# Patient Record
Sex: FEMALE | Race: WHITE | NOT HISPANIC OR LATINO | URBAN - METROPOLITAN AREA
[De-identification: names, ages, dates, MRNs, and addresses within clinical notes are randomized per-mention and may not be internally consistent; named-entity substitution may affect disease eponyms.]

---

## 2017-08-31 ENCOUNTER — EMERGENCY (EMERGENCY)
Facility: HOSPITAL | Age: 27
LOS: 1 days | Discharge: HOME | End: 2017-08-31
Attending: EMERGENCY MEDICINE | Admitting: EMERGENCY MEDICINE
Payer: COMMERCIAL

## 2017-08-31 VITALS
HEIGHT: 66 IN | RESPIRATION RATE: 18 BRPM | TEMPERATURE: 98 F | DIASTOLIC BLOOD PRESSURE: 87 MMHG | SYSTOLIC BLOOD PRESSURE: 127 MMHG | OXYGEN SATURATION: 99 % | HEART RATE: 92 BPM | WEIGHT: 145.06 LBS

## 2017-08-31 VITALS
DIASTOLIC BLOOD PRESSURE: 66 MMHG | RESPIRATION RATE: 18 BRPM | OXYGEN SATURATION: 98 % | HEART RATE: 67 BPM | TEMPERATURE: 98 F | SYSTOLIC BLOOD PRESSURE: 101 MMHG

## 2017-08-31 DIAGNOSIS — R10.11 RIGHT UPPER QUADRANT PAIN: ICD-10-CM

## 2017-08-31 DIAGNOSIS — R10.31 RIGHT LOWER QUADRANT PAIN: ICD-10-CM

## 2017-08-31 LAB
ALBUMIN SERPL ELPH-MCNC: 4 G/DL — SIGNIFICANT CHANGE UP (ref 3.4–5)
ALP SERPL-CCNC: 50 U/L — SIGNIFICANT CHANGE UP (ref 40–120)
ALT FLD-CCNC: 16 U/L — SIGNIFICANT CHANGE UP (ref 12–42)
ANION GAP SERPL CALC-SCNC: 8 MMOL/L — LOW (ref 9–16)
APPEARANCE UR: CLEAR — SIGNIFICANT CHANGE UP
AST SERPL-CCNC: 8 U/L — LOW (ref 15–37)
BASOPHILS NFR BLD AUTO: 0.4 % — SIGNIFICANT CHANGE UP (ref 0–2)
BILIRUB SERPL-MCNC: 0.3 MG/DL — SIGNIFICANT CHANGE UP (ref 0.2–1.2)
BILIRUB UR-MCNC: NEGATIVE — SIGNIFICANT CHANGE UP
BUN SERPL-MCNC: 19 MG/DL — SIGNIFICANT CHANGE UP (ref 7–23)
CALCIUM SERPL-MCNC: 8.9 MG/DL — SIGNIFICANT CHANGE UP (ref 8.5–10.5)
CHLORIDE SERPL-SCNC: 104 MMOL/L — SIGNIFICANT CHANGE UP (ref 96–108)
CO2 SERPL-SCNC: 30 MMOL/L — SIGNIFICANT CHANGE UP (ref 22–31)
COLOR SPEC: YELLOW — SIGNIFICANT CHANGE UP
CREAT SERPL-MCNC: 0.93 MG/DL — SIGNIFICANT CHANGE UP (ref 0.5–1.3)
DIFF PNL FLD: (no result)
EOSINOPHIL NFR BLD AUTO: 1.7 % — SIGNIFICANT CHANGE UP (ref 0–6)
GLUCOSE SERPL-MCNC: 102 MG/DL — HIGH (ref 70–99)
GLUCOSE UR QL: NEGATIVE — SIGNIFICANT CHANGE UP
HCG UR QL: NEGATIVE — SIGNIFICANT CHANGE UP
HCT VFR BLD CALC: 37.4 % — SIGNIFICANT CHANGE UP (ref 34.5–45)
HGB BLD-MCNC: 13.5 G/DL — SIGNIFICANT CHANGE UP (ref 11.5–15.5)
IMM GRANULOCYTES NFR BLD AUTO: 0.4 % — SIGNIFICANT CHANGE UP (ref 0–1.5)
KETONES UR-MCNC: NEGATIVE — SIGNIFICANT CHANGE UP
LEUKOCYTE ESTERASE UR-ACNC: (no result)
LIDOCAIN IGE QN: 190 U/L — SIGNIFICANT CHANGE UP (ref 73–393)
LYMPHOCYTES # BLD AUTO: 31.6 % — SIGNIFICANT CHANGE UP (ref 13–44)
MCHC RBC-ENTMCNC: 31 PG — SIGNIFICANT CHANGE UP (ref 27–34)
MCHC RBC-ENTMCNC: 36.1 G/DL — HIGH (ref 32–36)
MCV RBC AUTO: 86 FL — SIGNIFICANT CHANGE UP (ref 80–100)
MONOCYTES NFR BLD AUTO: 6.1 % — SIGNIFICANT CHANGE UP (ref 2–14)
NEUTROPHILS NFR BLD AUTO: 59.8 % — SIGNIFICANT CHANGE UP (ref 43–77)
NITRITE UR-MCNC: NEGATIVE — SIGNIFICANT CHANGE UP
PH UR: 6.5 — SIGNIFICANT CHANGE UP (ref 5–8)
PLATELET # BLD AUTO: 188 K/UL — SIGNIFICANT CHANGE UP (ref 150–400)
POTASSIUM SERPL-MCNC: 3.2 MMOL/L — LOW (ref 3.5–5.3)
POTASSIUM SERPL-SCNC: 3.2 MMOL/L — LOW (ref 3.5–5.3)
PROT SERPL-MCNC: 7.2 G/DL — SIGNIFICANT CHANGE UP (ref 6.4–8.2)
PROT UR-MCNC: NEGATIVE MG/DL — SIGNIFICANT CHANGE UP
RBC # BLD: 4.35 M/UL — SIGNIFICANT CHANGE UP (ref 3.8–5.2)
RBC # FLD: 11 % — SIGNIFICANT CHANGE UP (ref 10.3–16.9)
SODIUM SERPL-SCNC: 142 MMOL/L — SIGNIFICANT CHANGE UP (ref 132–145)
SP GR SPEC: 1.01 — SIGNIFICANT CHANGE UP (ref 1–1.03)
UROBILINOGEN FLD QL: 0.2 E.U./DL — SIGNIFICANT CHANGE UP
WBC # BLD: 8.4 K/UL — SIGNIFICANT CHANGE UP (ref 3.8–10.5)
WBC # FLD AUTO: 8.4 K/UL — SIGNIFICANT CHANGE UP (ref 3.8–10.5)

## 2017-08-31 PROCEDURE — 76705 ECHO EXAM OF ABDOMEN: CPT | Mod: 26

## 2017-08-31 PROCEDURE — 99285 EMERGENCY DEPT VISIT HI MDM: CPT | Mod: 25

## 2017-08-31 PROCEDURE — 74177 CT ABD & PELVIS W/CONTRAST: CPT | Mod: 26

## 2017-08-31 RX ORDER — IOHEXOL 300 MG/ML
50 INJECTION, SOLUTION INTRAVENOUS ONCE
Qty: 0 | Refills: 0 | Status: COMPLETED | OUTPATIENT
Start: 2017-08-31 | End: 2017-08-31

## 2017-08-31 RX ORDER — SODIUM CHLORIDE 9 MG/ML
1000 INJECTION INTRAMUSCULAR; INTRAVENOUS; SUBCUTANEOUS ONCE
Qty: 0 | Refills: 0 | Status: COMPLETED | OUTPATIENT
Start: 2017-08-31 | End: 2017-08-31

## 2017-08-31 RX ADMIN — IOHEXOL 50 MILLILITER(S): 300 INJECTION, SOLUTION INTRAVENOUS at 06:45

## 2017-08-31 RX ADMIN — SODIUM CHLORIDE 1000 MILLILITER(S): 9 INJECTION INTRAMUSCULAR; INTRAVENOUS; SUBCUTANEOUS at 02:43

## 2017-08-31 RX ADMIN — IOHEXOL 50 MILLILITER(S): 300 INJECTION, SOLUTION INTRAVENOUS at 02:42

## 2017-08-31 NOTE — ED ADULT NURSE NOTE - CHPI ED SYMPTOMS NEG
no dysuria/no vomiting/no diarrhea/no blood in stool/no hematuria/no abdominal distension/no burning urination/no chills/no fever/no nausea

## 2017-08-31 NOTE — ED PROVIDER NOTE - MEDICAL DECISION MAKING DETAILS
rule out appy, gallstone. patient currently deferring analgesics. will continue to monitor, ck abd labs.

## 2017-08-31 NOTE — ED ADULT NURSE NOTE - OBJECTIVE STATEMENT
Pt presents to ED with c/o RUQ pain- woke her up from sleep. Upon RN assessment, states pain has resolved. No N/V/D. Appears in NAD.

## 2017-08-31 NOTE — ED PROVIDER NOTE - OBJECTIVE STATEMENT
, no pmhx, presents with resolved RUQ pain that awoke her from sleep this evening. notes pain was severe, sharp and gas like pain. no N/V/D. denies cp or sob. notes pain slowly resolved. denies rash. denies prior hx of gallstone, kidney stone or similar. no abd surgeries. denies uti symptoms.

## 2017-08-31 NOTE — ED PROVIDER NOTE - PROGRESS NOTE DETAILS
patient with resolved pain and tolerated POs. discussed repeating ct scan. patient has no palpable ttp over mcburneys, no fever, or elev wbc. no n/v in ed and eating and drinking at this time, will defer repeat scan. lives walking distance, and will return to ed for any return of pain to ruq or worsening. she verbalized understanding of plan and has no questions.
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
## 2024-09-17 NOTE — ED ADULT NURSE NOTE - CAS TRG GENERAL AIRWAY, MLM
REFERRING PHYSICIAN: Aleta Rosales MD     DATE: 9/17/2024  SURGEON(S):  ALETA ROSALES MD     PREOPERATIVE DIAGNOSIS: Left 5th metatarsal Bean fracture.     POSTOPERATIVE DIAGNOSIS: Left 5th metatarsal Bean fracture.     PROCEDURE PERFORMED:  Open reduction and internal fixation, left 5th metatarsal Bean fracture.       ANESTHESIA:  Popliteal block with sedation.     ESTIMATED BLOOD LOSS:  Minimal.     COMPLICATIONS:  None.     INDICATIONS FOR PROCEDURE: Nakia is an 24-year-old female who injured her left foot last week.  She was seen in my office.  Radiographs revealed a left 5th metatarsal Bean fracture.  Operative versus nonoperative treatment was discussed with her.  Due to the increased risk of nonunion and refracture, she elected to proceed with surgery.  Risks of surgery were discussed with her which include, but are not limited to, bleeding, infection, damage to nerves, pain, need for further surgery in the future, possibility of nonunion, of malunion, the need for prolonged immobilization, nonweightbearing, elevation, activity modification, risk of DVT.  She understood all these risks and elected to proceed with surgery.     DESCRIPTION OF PROCEDURE:  I saw her on the day of surgery.  We again reviewed the risks and benefits of surgery.  She again elected to proceed.  The left leg was marked as the correct operative extremity.  The surgical consent was signed.  Sedation was given by anesthesia staff and a popliteal block was performed by Anesthesia staff.  She was then taken to the operating room.  A time-out was performed to confirm the correct patient, the correct procedure, and the correct extremity.  She was transferred to the operating room table.  Sedation was given by anesthesia staff.  2 g of Ancef was administered intravenously.  All bony prominences were well padded.  The left lower extremity was prepped and draped in the usual sterile fashion.  I elevated the limb, exsanguinated it with an  Esmarch, and utilized the Esmarch as an ankle tourniquet.  I made an approximately 1.5 cm oblique incision just proximal to the base of the 5th metatarsal, cutting sharply through the skin with a scalpel.  I dissected through the subcutaneous tissue.  Peroneus brevis was identified and retracted.  Under fluoroscopy, a guidewire was placed in the \"high and tight\" position and advanced down the 5th metatarsal shaft.  I then measured, drilled, tapped and then placed the appropriate length Arthrex solid 4.5, 40 mm long screw down the 5th metatarsal canal.  Multiple fluoroscopy shots showed this to be in good position with good position of the fracture.  The wound was irrigated.  The subcu was closed with 3-0 Monocryl, skin with 3-0 nylon.  Sterile dressings were applied.  Esmarch was removed.  There was brisk vascular inflow to all the toes.  She was placed into a well-padded posterior mold splint with the ankle in neutral dorsiflexion.  Once the splint hardened, she was reversed from her anesthetic and taken to recovery room in stable condition having tolerated the procedure well.  All counts were correct at the end of the case.       I had the chance to speak with hi her mom after surgery.  I explained to her that surgery went well.  I will see her for followup next week.   Patent